# Patient Record
Sex: FEMALE | Race: WHITE | Employment: FULL TIME | URBAN - METROPOLITAN AREA
[De-identification: names, ages, dates, MRNs, and addresses within clinical notes are randomized per-mention and may not be internally consistent; named-entity substitution may affect disease eponyms.]

---

## 2017-09-27 ENCOUNTER — OFFICE VISIT (OUTPATIENT)
Dept: OBGYN | Facility: CLINIC | Age: 37
End: 2017-09-27
Payer: COMMERCIAL

## 2017-09-27 VITALS
BODY MASS INDEX: 25.16 KG/M2 | WEIGHT: 166 LBS | HEIGHT: 68 IN | SYSTOLIC BLOOD PRESSURE: 110 MMHG | DIASTOLIC BLOOD PRESSURE: 68 MMHG

## 2017-09-27 VITALS
HEIGHT: 70 IN | HEART RATE: 68 BPM | WEIGHT: 169 LBS | SYSTOLIC BLOOD PRESSURE: 112 MMHG | BODY MASS INDEX: 24.2 KG/M2 | DIASTOLIC BLOOD PRESSURE: 76 MMHG

## 2017-09-27 DIAGNOSIS — Z01.419 ENCOUNTER FOR GYNECOLOGICAL EXAMINATION WITHOUT ABNORMAL FINDING: Primary | ICD-10-CM

## 2017-09-27 DIAGNOSIS — Z30.09 GENERAL COUNSELING AND ADVICE ON CONTRACEPTIVE MANAGEMENT: ICD-10-CM

## 2017-09-27 DIAGNOSIS — N89.8 WHITE VAGINAL DISCHARGE: ICD-10-CM

## 2017-09-27 DIAGNOSIS — B96.89 BV (BACTERIAL VAGINOSIS): ICD-10-CM

## 2017-09-27 DIAGNOSIS — N76.0 BV (BACTERIAL VAGINOSIS): ICD-10-CM

## 2017-09-27 LAB
SPECIMEN SOURCE: ABNORMAL
WET PREP SPEC: ABNORMAL

## 2017-09-27 PROCEDURE — 99212 OFFICE O/P EST SF 10 MIN: CPT | Mod: 25 | Performed by: OBSTETRICS & GYNECOLOGY

## 2017-09-27 PROCEDURE — 87210 SMEAR WET MOUNT SALINE/INK: CPT | Performed by: OBSTETRICS & GYNECOLOGY

## 2017-09-27 PROCEDURE — 99395 PREV VISIT EST AGE 18-39: CPT | Performed by: OBSTETRICS & GYNECOLOGY

## 2017-09-27 RX ORDER — METRONIDAZOLE 500 MG/1
500 TABLET ORAL 2 TIMES DAILY
Qty: 14 TABLET | Refills: 0 | Status: SHIPPED | OUTPATIENT
Start: 2017-09-27 | End: 2019-11-20

## 2017-09-27 ASSESSMENT — ANXIETY QUESTIONNAIRES
1. FEELING NERVOUS, ANXIOUS, OR ON EDGE: SEVERAL DAYS
5. BEING SO RESTLESS THAT IT IS HARD TO SIT STILL: NOT AT ALL
GAD7 TOTAL SCORE: 4
2. NOT BEING ABLE TO STOP OR CONTROL WORRYING: SEVERAL DAYS
6. BECOMING EASILY ANNOYED OR IRRITABLE: NOT AT ALL
IF YOU CHECKED OFF ANY PROBLEMS ON THIS QUESTIONNAIRE, HOW DIFFICULT HAVE THESE PROBLEMS MADE IT FOR YOU TO DO YOUR WORK, TAKE CARE OF THINGS AT HOME, OR GET ALONG WITH OTHER PEOPLE: NOT DIFFICULT AT ALL
7. FEELING AFRAID AS IF SOMETHING AWFUL MIGHT HAPPEN: SEVERAL DAYS
3. WORRYING TOO MUCH ABOUT DIFFERENT THINGS: SEVERAL DAYS

## 2017-09-27 ASSESSMENT — PATIENT HEALTH QUESTIONNAIRE - PHQ9
5. POOR APPETITE OR OVEREATING: NOT AT ALL
SUM OF ALL RESPONSES TO PHQ QUESTIONS 1-9: 0

## 2017-09-27 NOTE — MR AVS SNAPSHOT
"              After Visit Summary   2017    Mulu Newberry    MRN: [id number]           Patient Information     Date Of Birth          1980        Visit Information        Provider Department      2017 12:30 PM Earline Stafford MD [facility]        Today's Diagnoses     Encounter for gynecological examination without abnormal finding    -  1    White vaginal discharge        General counseling and advice on contraceptive management        BV (bacterial vaginosis)           Follow-ups after your visit        Who to contact     If you have questions or need follow up information about today's clinic visit or your schedule please contact [facility] directly at [phone].  Normal or non-critical lab and imaging results will be communicated to you by MyChart, letter or phone within 4 business days after the clinic has received the results. If you do not hear from us within 7 days, please contact the clinic through [employer] or phone. If you have a critical or abnormal lab result, we will notify you by phone as soon as possible.  Submit refill requests through [employer] or call your pharmacy and they will forward the refill request to us. Please allow 3 business days for your refill to be completed.          Additional Information About Your Visit        MyChart Information     [employer] lets you send messages to your doctor, view your test results, renew your prescriptions, schedule appointments and more. To sign up, go to [email or web address] . Click on \"Log in\" on the left side of the screen, which will take you to the Welcome page. Then click on \"Sign up Now\" on the right side of the page.     You will be asked to enter the access code listed below, as well as some personal information. Please follow the directions to create your username and password.     Your access code is: [email or web address]  Expires: 2017  1:15 PM     Your access code will  in 90 " "days. If you need help or a new code, please call your [facility] clinic or [phone].        Care EveryWhere ID     This is your Care EveryWhere ID. This could be used by other organizations to access your [facility] medical records  [id number]        Your Vitals Were     Height Last Period Breastfeeding? BMI (Body Mass Index)          5' 7.75\" (1.721 m) 09/13/2017 No 25.43 kg/m2         Blood Pressure from Last 3 Encounters:   09/27/17 110/68   11/13/14 112/76   09/01/12 108/74    Weight from Last 3 Encounters:   09/27/17 166 lb (75.3 kg)   11/13/14 169 lb (76.7 kg)   09/01/12 160 lb (72.6 kg)              We Performed the Following     Wet prep          Today's Medication Changes          These changes are accurate as of: 9/27/17  1:15 PM.  If you have any questions, ask your nurse or doctor.               Start taking these medicines.        Dose/Directions    metroNIDAZOLE 500 MG tablet   Commonly known as:  FLAGYL   Used for:  BV (bacterial vaginosis)   Started by:  Earline Stafford MD        Dose:  500 mg   Take 1 tablet (500 mg) by mouth 2 times daily   Quantity:  14 tablet   Refills:  0            Where to get your medicines      These medications were sent to [facility] [address] [place], [place] [address] [facility] [address]  [address] [facility], [facility] [address]     Phone:  [phone]     metroNIDAZOLE 500 MG tablet                Primary Care Provider    None Specified       No primary provider on file.        Equal Access to Services     [facility]: Hadyaneth villarreal Sojosie, waaxda luqadaha, qaybta kaalmaluc pizarro. So [facility] [phone].    ATENCIÓN: Si habla español, tiene a jean disposición servicios gratuitos de asistencia lingüística. Llame al [phone].    We comply with applicable federal civil rights laws and [place] laws. We do not discriminate on the basis of race, color, national origin, " age, disability sex, sexual orientation or gender identity.            Thank you!     Thank you for choosing [facility]  for your care. Our goal is always to provide you with excellent care. Hearing back from our patients is one way we can continue to improve our services. Please take a few minutes to complete the written survey that you may receive in the mail after your visit with us. Thank you!             Your Updated Medication List - Protect others around you: Learn how to safely use, store and throw away your medicines at www.disposemymeds.org.          This list is accurate as of: 9/27/17  1:15 PM.  Always use your most recent med list.                   Brand Name Dispense Instructions for use Diagnosis    metroNIDAZOLE 500 MG tablet    FLAGYL    14 tablet    Take 1 tablet (500 mg) by mouth 2 times daily    BV (bacterial vaginosis)

## 2017-09-27 NOTE — PROGRESS NOTES
Mulu is a 37 year old  female who presents for annual exam.     Besides routine health maintenance,  she would like to discuss birth control, has been using boric acid for recurrent BV.    HPI:  The patient does not have PCP .    Patient is up to dates on paps  Pap and hpv q 5 y  She has chronic vaginal discharge after each menses  Was given prescription for metrogel but never took it  Doesn't want to have kids  Might benefit from mirena iud which could stop her cycles and that would prob mean less issues with vaginitis  Has white discharge today   Given mirena info to take home to read, call with onset of menses to schedule iud insertion   Wet smear today- clue cells + consistent with BV  No odor or itching noted      GYNECOLOGIC HISTORY:    Patient's last menstrual period was 2017.  Her current contraception method is: none.  She  reports that she has quit smoking. Her smoking use included Cigarettes. She has never used smokeless tobacco.      Patient is sexually active.  STD testing offered?  Declined  Last PHQ-9 score on record =   PHQ-9 SCORE 2017   Total Score 0     Last GAD7 score on record =   DARIUS-7 SCORE 2017   Total Score 4     Alcohol Score = 4    HEALTH MAINTENANCE:  Cholesterol: (  Cholesterol   Date Value Ref Range Status   2006 174 0 - 200 mg/dL Final     Comment:     LDL Cholesterol is the primary guide to therapy: LDL-cholesterol goal in high   risk patients is <100 mg/dL and in very high risk patients is <70 mg/dL.   The NCEP recommends further evaluation of: patients with cholesterol <200 mg/dL   if additional risk factors are present, cholesterol >240 mg/dL, triglycerides   >150 mg/dL, or HDL <40 mg/dL.      Last Mammo: NA, Result: not applicable, Next Mammo: 1 year   Pap: (  Lab Results   Component Value Date    PAP NIL 2006    ) 2014 WNL HPV-  Colonoscopy:  NA, Result: not applicable, Next Colonoscopy: due at 48 years.  Dexa:  NA    Health  "maintenance updated:  yes    HISTORY:  Obstetric History       T0      L0     SAB0   TAB0   Ectopic0   Multiple0   Live Births0       Obstetric Comments   LMP unsure, used to use depo for 2 years, stopped on 6 months ago, since then nuvaring for contraception       Patient Active Problem List   Diagnosis     Tobacco use disorder     Contraceptive management     Past Surgical History:   Procedure Laterality Date     TONSILLECTOMY      at age of 15      Social History   Substance Use Topics     Smoking status: Former Smoker     Types: Cigarettes     Smokeless tobacco: Never Used      Comment: casual -- 1 per day, more on weekends     Alcohol use Yes      Comment: casual-5-6 per week      Problem (# of Occurrences) Relation (Name,Age of Onset)    CANCER (4) Maternal Grandmother: ? origin, Maternal Grandfather: lung,smoker, Paternal Grandmother: ?origin, Paternal Grandfather: ?origin    Family History Negative (1) Brother    Hypertension (1) Father    Lipids (1) Father    Neurologic Disorder (1) Sister: seizure, also with learning disability            Current Outpatient Prescriptions   Medication Sig     metroNIDAZOLE (FLAGYL) 500 MG tablet Take 1 tablet (500 mg) by mouth 2 times daily     No current facility-administered medications for this visit.      No Known Allergies    Past medical, surgical, social and family histories were reviewed and updated in EPIC.    ROS:   12 point review of systems negative other than symptoms noted below.  Cardiovascular: Palpitations  Genitourinary: Vaginal Dryness  Endocrine: Decreased Libido  Psychiatric: Anxiety    EXAM:  /68  Ht 5' 7.75\" (1.721 m)  Wt 166 lb (75.3 kg)  LMP 2017  Breastfeeding? No  BMI 25.43 kg/m2   BMI: Body mass index is 25.43 kg/(m^2).    PHYSICAL EXAM:  Constitutional:  Appearance: Well nourished, well developed, alert, in no acute distress  Neck:  Lymph Nodes:  No lymphadenopathy present    Thyroid:  Gland size normal, nontender, " no nodules or masses present  on palpation  Chest:  Respiratory Effort:  Breathing unlabored  Cardiovascular:    Heart: Auscultation:  Regular rate, normal rhythm, no murmurs present  Breasts: Inspection of Breasts:  No lymphadenopathy present., Palpation of Breasts and Axillae:  No masses present on palpation, no breast tenderness., Axillary Lymph Nodes:  No lymphadenopathy present. and No nodularity, asymmetry or nipple discharge bilaterally.  Gastrointestinal:   Abdominal Examination:  Abdomen nontender to palpation, tone normal without rigidity or guarding, no masses present, umbilicus without lesions   Liver and Spleen:  No hepatomegaly present, liver nontender to palpation    Hernias:  No hernias present  Lymphatic: Lymph Nodes:  No other lymphadenopathy present  Skin:  General Inspection:  No rashes present, no lesions present, no areas of  discoloration    Genitalia and Groin:  No rashes present, no lesions present, no areas of  discoloration, no masses present  Neurologic/Psychiatric:    Mental Status:  Oriented X3     Pelvic Exam:  External Genitalia:     Normal appearance for age, no discharge present, no tenderness present, no inflammatory lesions present, color normal  Vagina:     Normal vaginal vault without central or paravaginal defects, no discharge present, no inflammatory lesions present, no masses present  Bladder:     Nontender to palpation  Urethra:   Urethral Body:  Urethra palpation normal, urethra structural support normal   Urethral Meatus:  No erythema or lesions present  Cervix:     Appearance healthy, no lesions present, nontender to palpation, no bleeding present  Uterus:     Uterus: firm, normal sized and nontender, anteverted in position.   Adnexa:     No adnexal tenderness present, no adnexal masses present  Perineum:     Perineum within normal limits, no evidence of trauma, no rashes or skin lesions present  Anus:     Anus within normal limits, no hemorrhoids present  Inguinal Lymph  Nodes:     No lymphadenopathy present  Pubic Hair:     Normal pubic hair distribution for age  Genitalia and Groin:     No rashes present, no lesions present, no areas of discoloration, no masses present      COUNSELING:   Reviewed preventive health counseling, as reflected in patient instructions       disc vaginitis and iud    BMI: Body mass index is 25.43 kg/(m^2).  Weight management plan: wt is ok    ASSESSMENT:  37 year old female with satisfactory annual exam.    ICD-10-CM    1. Encounter for gynecological examination without abnormal finding Z01.419    2. White vaginal discharge N89.8 Wet prep   3. General counseling and advice on contraceptive management Z30.09    4. BV (bacterial vaginosis) N76.0 metroNIDAZOLE (FLAGYL) 500 MG tablet    B96.89        PLAN:  rec return for iud insertion during menses  Wet smear showed  Discussed vaginitis  Discussed contraceptive options  Ph is probably elevated after each menses which could be contributing to her vaginal discharge issues each month  Patient didn't like the metallic taste in her mouth from flagyl so didn't take the metrogel prescription she was given in the past  Additional 10 minute of visit was spent discussing vaginitis and options to suppress menses outside of her preventive services  We are going to use oral flagyl due to how chronic the issue has been  Informed consent obtained for iud insertion today, risks and benefits discussed      Earline Stafford MD

## 2017-09-28 ASSESSMENT — ANXIETY QUESTIONNAIRES: GAD7 TOTAL SCORE: 4

## 2017-10-06 ENCOUNTER — OFFICE VISIT (OUTPATIENT)
Dept: MIDWIFE SERVICES | Facility: CLINIC | Age: 37
End: 2017-10-06
Payer: COMMERCIAL

## 2017-10-06 VITALS
DIASTOLIC BLOOD PRESSURE: 58 MMHG | BODY MASS INDEX: 25.49 KG/M2 | SYSTOLIC BLOOD PRESSURE: 119 MMHG | HEIGHT: 68 IN | WEIGHT: 168.2 LBS

## 2017-10-06 DIAGNOSIS — Z01.812 PRE-PROCEDURE LAB EXAM: ICD-10-CM

## 2017-10-06 DIAGNOSIS — Z30.430 ENCOUNTER FOR INSERTION OF INTRAUTERINE CONTRACEPTIVE DEVICE (IUD): Primary | ICD-10-CM

## 2017-10-06 DIAGNOSIS — Z97.5 PRESENCE OF INTRAUTERINE CONTRACEPTIVE DEVICE: ICD-10-CM

## 2017-10-06 LAB — BETA HCG QUAL IFA URINE: NEGATIVE

## 2017-10-06 PROCEDURE — 58300 INSERT INTRAUTERINE DEVICE: CPT | Performed by: ADVANCED PRACTICE MIDWIFE

## 2017-10-06 PROCEDURE — 84703 CHORIONIC GONADOTROPIN ASSAY: CPT | Performed by: ADVANCED PRACTICE MIDWIFE

## 2017-10-06 NOTE — MR AVS SNAPSHOT
After Visit Summary   10/6/2017    Mulu Newberry    MRN: [id number]           Patient Information     Date Of Birth          1980        Visit Information        Provider Department      10/6/2017 3:30 PM Chula Cortes APRN CNM [facility]        Today's Diagnoses     Encounter for insertion of intrauterine contraceptive device (IUD)    -  1    Pre-procedure lab exam        Presence of intrauterine contraceptive device          Care Instructions    Your Intrauterine Device (IUD)     What to watch for right after IUD placement:      Some women may experience uterine cramps, bleeding, and/or dizziness during and right after IUD placement       To help minimize the cramps, you may take ibuprofen 600 mg with food prior to your appointment. These symptoms should improve over the next 24 hours.  Mild cramping may be present for a few days after IUD placement. You may continue taking ibuprofen as directed if needed      You may experience spotting or bleeding for the first few weeks after IUD placement      Use condoms or abstain from sex for 7 days after the insertion of your Mirena or Jessika IUD      If you experience fever, abdominal pain, worsening pelvic pain, dizziness, unusually heavy vaginal bleeding, suspected expulsion of device or four smelling vaginal discharge please come to the clinic for evaluation      Please schedule an appointment at the clinic for a string check 4-6 weeks after IUD placement    Your periods may change (Jessika/Mirena):      For the first 3 to 6 months, your monthly period may become irregular. You may also have frequent spotting or light bleeding. A few women have heavy bleeding during this time. After your body adjusts, the number of bleeding days is likely to decrease (but may remain irregular), and you may even find that your periods stop altogether for as long as Jessika/Mirena is in place.  Your periods will return rapidly once the IUD is  removed.      ParaGard IUD users:      ParaGard IUD users may experience heavier than normal cycles while their IUD is in place, this is considered normal     Back-up contraception is not needed                Checking for your strings:      We encourage everyone with an IUD in place to check for their strings monthly      You may check your own IUD strings by inserting a finger into the vagina and feeling the strings as they exit the cervix.  The strings will initially feel firm, like fishing line, but will soften over a few weeks.  After the strings have softened, you or your partner should not be able to feel the strings during intercourse.       If the string length greatly changes or if you cannot feel your strings at all please make an appointment to see you midwife and use a backup method of contraception like a condom.      If you can feel something hard/plastic like the IUD may not be in the correct place. You should then see your healthcare provider to have the position confirmed with ultrasound.       Remember:    IUD's do not protect against HIV or STIs.  IUD's do not prevent the formation of ovarian cysts.  IUD's do not typically reduce acne or cause weight gain or mood changes.    For more information:  [email or web address]/      If you have questions or concerns please call:    [facility]   [phone]              Follow-ups after your visit        Follow-up notes from your care team     Return in about 3 months (around 1/6/2018).      Who to contact     If you have questions or need follow up information about today's clinic visit or your schedule please contact [facility] directly at [phone].  Normal or non-critical lab and imaging results will be communicated to you by MyChart, letter or phone within 4 business days after the clinic has received the results. If you do not hear from us within 7 days, please contact the clinic through MyChart or phone.  "If you have a critical or abnormal lab result, we will notify you by phone as soon as possible.  Submit refill requests through [employer] or call your pharmacy and they will forward the refill request to us. Please allow 3 business days for your refill to be completed.          Additional Information About Your Visit        Mobykohart Information     [employer] lets you send messages to your doctor, view your test results, renew your prescriptions, schedule appointments and more. To sign up, go to [email or web address] . Click on \"Log in\" on the left side of the screen, which will take you to the Welcome page. Then click on \"Sign up Now\" on the right side of the page.     You will be asked to enter the access code listed below, as well as some personal information. Please follow the directions to create your username and password.     Your access code is: [email or web address]  Expires: 2017  1:15 PM     Your access code will  in 90 days. If you need help or a new code, please call your [place] clinic or [phone].        Care EveryWhere ID     This is your Care EveryWhere ID. This could be used by other organizations to access your [place] medical records  [id number]        Your Vitals Were     Height Last Period BMI (Body Mass Index)             5' 7.5\" (1.715 m) 2017 25.96 kg/m2          Blood Pressure from Last 3 Encounters:   10/06/17 119/58   17 110/68   14 112/76    Weight from Last 3 Encounters:   10/06/17 168 lb 3.2 oz (76.3 kg)   17 166 lb (75.3 kg)   14 169 lb (76.7 kg)              We Performed the Following     Beta HCG qual IFA urine - [facility] and [place]      LEVONORGESTREL IU 52MG 5 YR     INSERTION INTRAUTERINE DEVICE          Today's Medication Changes          These changes are accurate as of: 10/6/17  4:07 PM.  If you have any questions, ask your nurse or doctor.               Start taking these medicines.        Dose/Directions    levonorgestrel 20 MCG/24HR " IUD   Commonly known as:  MIRENA (52 MG)   Used for:  Encounter for insertion of intrauterine contraceptive device (IUD)   Started by:  Chula Cortes APRN CNM        Dose:  1 each   1 each (20 mcg) by Intrauterine route once for 1 dose   Quantity:  1 each   Refills:  0            Where to get your medicines      Some of these will need a paper prescription and others can be bought over the counter.  Ask your nurse if you have questions.     You don't need a prescription for these medications     levonorgestrel 20 MCG/24HR IUD                Primary Care Provider    None Specified       No primary provider on file.        Equal Access to Services     [facility]: Hadyaneth Carl, allan weslh, june tate, luc gates . So [facility] [phone].    ATENCIÓN: Si habla español, tiene a jean disposición servicios gratuitos de asistencia lingüística. Llame al [phone].    We comply with applicable federal civil rights laws and [place] laws. We do not discriminate on the basis of race, color, national origin, age, disability, sex, sexual orientation, or gender identity.            Thank you!     Thank you for choosing [facility]  for your care. Our goal is always to provide you with excellent care. Hearing back from our patients is one way we can continue to improve our services. Please take a few minutes to complete the written survey that you may receive in the mail after your visit with us. Thank you!             Your Updated Medication List - Protect others around you: Learn how to safely use, store and throw away your medicines at www.disposemymeds.org.          This list is accurate as of: 10/6/17  4:07 PM.  Always use your most recent med list.                   Brand Name Dispense Instructions for use Diagnosis    levonorgestrel 20 MCG/24HR IUD    MIRENA (52 MG)    1 each    1 each (20 mcg) by Intrauterine route once for 1  dose    Encounter for insertion of intrauterine contraceptive device (IUD)       metroNIDAZOLE 500 MG tablet    FLAGYL    14 tablet    Take 1 tablet (500 mg) by mouth 2 times daily    BV (bacterial vaginosis)

## 2017-10-06 NOTE — PROGRESS NOTES
Results discussed directly with patient while patient was present. Any further details documented in the note.    Chula Cortes DNP, APRN, CNM

## 2017-10-06 NOTE — PROGRESS NOTES
MIDWIFE IUD PLACEMENT NOTE    IUD type: Mirena  Lot #: [id number]  NDC#: 88407-137-84    HPI:   Mulu Newberry is a 37 year old female here today for IUD insertion.  Patient's last menstrual period was 09/13/2017.  Today's pregnancy test - Negative  Patient has premedicated with Ibuprofen 600 mgs  Patient did not use Cytotec prior to IUD insertion  STD testing offered? declined  Patient does not have any infections or cervicitis.  Being treated for BV; has chronic BV each month.  Is currently on day 3 of treatment, symptoms have subsided.  Patient does not have history of liver problems or cancer.     Patient has been given written information.  I have reviewed the risks of the IUD including pregnancy, PID, life threatening infection, perforation, expulsion, cramping, changes in bleeding and ovarian cysts. Benefits of the IUD and alternative family planning methods have been discussed.  The probable mechanisms of action were covered, failure rates, spontaneous expulsion, the importance of checking the string monthly, as well as minor or nuisance side effects such as ovarian cysts, migraines, skin changes irregular and unpredictable bleeding in the first 6 months of use and bleeding patterns after the first 6 months.  The 's printed material was provided for her review.  Patients questions are answered.  Patient has verbalized understanding of risks and benefits and has signed the consent form.      Health maintenance updated:  no    No Known Allergies  Current Outpatient Prescriptions   Medication Sig Dispense Refill     metroNIDAZOLE (FLAGYL) 500 MG tablet Take 1 tablet (500 mg) by mouth 2 times daily 14 tablet 0      Past Medical History:   Diagnosis Date     Anxiety      Asthma     exercise induced     Family History   Problem Relation Age of Onset     Hypertension Father      Lipids Father      CANCER Maternal Grandmother      ? origin     CANCER Maternal Grandfather      lung,smoker     CANCER  "Paternal Grandmother      ?origin     CANCER Paternal Grandfather      ?origin     Neurologic Disorder Sister      seizure, also with learning disability     Family History Negative Brother      Social History     Social History     Marital status: Single     Spouse name: N/A     Number of children: N/A     Years of education: N/A     Occupational History     dental hyegenist [employer]     Social History Main Topics     Smoking status: Former Smoker     Types: Cigarettes     Smokeless tobacco: Never Used      Comment: casual -- 1 per day, more on weekends     Alcohol use Yes      Comment: casual-5-6 per week     Drug use: No     Sexual activity: Yes     Partners: Male     Birth control/ protection: None     Other Topics Concern      Service No     Blood Transfusions No     Caffeine Concern Yes     1-2 cups per day     Occupational Exposure Yes     dental hyegeinist     Hobby Hazards No     Sleep Concern No     Stress Concern No     Weight Concern No     Special Diet Yes     tries to eat organic, avoids sugar     Back Care No     Exercise Yes     works once a day     Bike Helmet No     Seat Belt Yes     Self-Exams Yes     Social History Narrative    Single, works fulltime, no children     Past Surgical History:   Procedure Laterality Date     TONSILLECTOMY      at age of 15       REVIEW OF SYSTEMS:  12 point review of systems negative other than symptoms noted below.      EXAM:  /58  Ht 5' 7.5\" (1.715 m)  Wt 168 lb 3.2 oz (76.3 kg)  LMP 09/13/2017  BMI 25.96 kg/m2    PELVIC EXAM:  Vulva: No external lesions, BUS WNL  Vagina: Moist, pink, discharge normal  well rugated, no lesions  Cervix: smooth, pink, no visible lesions, neg CMT  Uterus: Normal size, retroverted, non-tender, mobile  Ovaries: No mass, non-tender  Rectal exam: deferred      ASSESSMENT/ PLAN:  (Z01.812) Pre-procedure lab exam  (primary encounter diagnosis)  Plan: Beta HCG qual IFA urine - [facility] and [place]    Results for " orders placed or performed in visit on 10/06/17   Beta HCG qual IFA urine - [facility] [place] and [place]   Result Value Ref Range    Beta HCG Qual IFA Urine Negative NEG^Negative          Procedure:  Uterus assessed for position and is retroverted.  Sterile speculum inserted.  Betadine prep of cervix done.  Tenaculum applied at 10 and 2 o'clock.  Uterine sounded to 7cm's.  Cervical dilator (Os-finder) used.   IUD inserted in the usual fashion without difficulty.  Tenaculum removed with scant bleeding from the cervix.  Strings trimmed to 2 cm's.  Patient tolerated the procedure well        COUNSELING:  Verbal and written instructions given to patient regarding checking IUD strings.    Reviewed warning signs of fever, sharp/severe abdominal pain, reassured it can be normal to have menstrual like cramping after placement and spotting/light bleeding may last a few weeks after placement.    Reviewed with patient that the IUD needs to be replaced in 5 years, nothing in vagina for 2 days following placement of Mirena or Jessika IUD's.  Use b/u method for one week following IUD placement.    Follow up appointment in 6 to 12 weeks for IUD/string check    Chula Cortes, JUDAH, APRN, CNM

## 2017-10-06 NOTE — PATIENT INSTRUCTIONS
Your Intrauterine Device (IUD)     What to watch for right after IUD placement:      Some women may experience uterine cramps, bleeding, and/or dizziness during and right after IUD placement       To help minimize the cramps, you may take ibuprofen 600 mg with food prior to your appointment. These symptoms should improve over the next 24 hours.  Mild cramping may be present for a few days after IUD placement. You may continue taking ibuprofen as directed if needed      You may experience spotting or bleeding for the first few weeks after IUD placement      Use condoms or abstain from sex for 7 days after the insertion of your Mirena or Jessika IUD      If you experience fever, abdominal pain, worsening pelvic pain, dizziness, unusually heavy vaginal bleeding, suspected expulsion of device or four smelling vaginal discharge please come to the clinic for evaluation      Please schedule an appointment at the clinic for a string check 4-6 weeks after IUD placement    Your periods may change (Jessika/Mirena):      For the first 3 to 6 months, your monthly period may become irregular. You may also have frequent spotting or light bleeding. A few women have heavy bleeding during this time. After your body adjusts, the number of bleeding days is likely to decrease (but may remain irregular), and you may even find that your periods stop altogether for as long as Jessika/Mirena is in place.  Your periods will return rapidly once the IUD is removed.      ParaGard IUD users:      ParaGard IUD users may experience heavier than normal cycles while their IUD is in place, this is considered normal     Back-up contraception is not needed                Checking for your strings:      We encourage everyone with an IUD in place to check for their strings monthly      You may check your own IUD strings by inserting a finger into the vagina and feeling the strings as they exit the cervix.  The strings will initially feel firm, like fishing  line, but will soften over a few weeks.  After the strings have softened, you or your partner should not be able to feel the strings during intercourse.       If the string length greatly changes or if you cannot feel your strings at all please make an appointment to see you midwife and use a backup method of contraception like a condom.      If you can feel something hard/plastic like the IUD may not be in the correct place. You should then see your healthcare provider to have the position confirmed with ultrasound.       Remember:    IUD's do not protect against HIV or STIs.  IUD's do not prevent the formation of ovarian cysts.  IUD's do not typically reduce acne or cause weight gain or mood changes.    For more information:  [email or web address]      If you have questions or concerns please call:    [facility]   [phone]

## 2019-11-19 NOTE — PROGRESS NOTES
Mulu is a 39 year old  female who presents for annual exam.     Besides routine health maintenance, she has no other health concerns today .    HPI:    Mirena iud placed last year has stopped her periods and cured her chronic recurrent vaginitis  Got  now  She is much happier and feels great  Pap and hpv due this yr    The patient's PCP is Dr Sneha Bynum      GYNECOLOGIC HISTORY:    No LMP recorded. (Menstrual status: IUD).    Regular menses? no  Menses every 0 days.      Her current contraception method is: IUD.  She  reports that she has quit smoking. Her smoking use included cigarettes. She has never used smokeless tobacco.    Patient is sexually active.  STD testing offered?  Declined  Last PHQ-9 score on record =   PHQ-9 SCORE 2019   PHQ-9 Total Score 0     Last GAD7 score on record =   DARIUS-7 SCORE 2019   Total Score 0     Alcohol Score = 0    HEALTH MAINTENANCE:  Cholesterol:   Cholesterol   Date Value Ref Range Status   2006 174 0 - 200 mg/dL Final     Comment:     LDL Cholesterol is the primary guide to therapy: LDL-cholesterol goal in high   risk patients is <100 mg/dL and in very high risk patients is <70 mg/dL.   The NCEP recommends further evaluation of: patients with cholesterol <200 mg/dL   if additional risk factors are present, cholesterol >240 mg/dL, triglycerides   >150 mg/dL, or HDL <40 mg/dL.     Last Mammo: scheduled for 2019   Pap: 2011 NIL HPV-  Lab Results   Component Value Date    PAP NIL 2006    Colonoscopy:  NA, Result: Not applicable, Next Colonoscopy: Age 48  Dexa:  NA  Health maintenance updated:  yes    HISTORY:  OB History    Para Term  AB Living   0 0 0 0 0 0   SAB TAB Ectopic Multiple Live Births   0 0 0 0 0   Obstetric Comments   LMP unsure, used to use depo for 2 years, stopped on 6 months ago, since then nuvaring for contraception       Patient Active Problem List   Diagnosis     Tobacco use disorder      "Presence of intrauterine contraceptive device     Past Surgical History:   Procedure Laterality Date     TONSILLECTOMY      at age of 15      Social History     Tobacco Use     Smoking status: Former Smoker     Types: Cigarettes     Smokeless tobacco: Never Used     Tobacco comment: casual -- 1 per day, more on weekends   Substance Use Topics     Alcohol use: Yes     Comment: casual-5-6 per week      Problem (# of Occurrences) Relation (Name,Age of Onset)    Cancer (4) Maternal Grandmother: ? origin, Maternal Grandfather: lung,smoker, Paternal Grandmother: ?origin, Paternal Grandfather: ?origin    Family History Negative (1) Brother    Hypertension (1) Father    Lipids (1) Father    Neurologic Disorder (1) Sister: seizure, also with learning disability            Current Outpatient Medications   Medication Sig     levonorgestrel (MIRENA, 52 MG,) 20 MCG/24HR IUD 1 each (20 mcg) by Intrauterine route once for 1 dose     sertraline (ZOLOFT) 50 MG tablet TK 1  T PO  QD     No current facility-administered medications for this visit.      No Known Allergies    Past medical, surgical, social and family histories were reviewed and updated in EPIC.    ROS:   12 point review of systems negative other than symptoms noted below or in the HPI.  No urinary frequency or dysuria, bladder or kidney problems    EXAM:  /64   Pulse 78   Ht 1.715 m (5' 7.5\")   Wt 82.6 kg (182 lb)   BMI 28.08 kg/m     BMI: Body mass index is 28.08 kg/m .    PHYSICAL EXAM:  Constitutional:   Appearance: Well nourished, well developed, alert, in no acute distress  Neck:  Lymph Nodes:  No lymphadenopathy present    Thyroid:  Gland size normal, nontender, no nodules or masses present  on palpation  Chest:  Respiratory Effort:  Breathing unlabored  Cardiovascular:    Heart: Auscultation:  Regular rate, normal rhythm, no murmurs present  Breasts: Inspection of Breasts:  No lymphadenopathy present., Palpation of Breasts and Axillae:  No masses " present on palpation, no breast tenderness., Axillary Lymph Nodes:  No lymphadenopathy present. and No nodularity, asymmetry or nipple discharge bilaterally.  Gastrointestinal:   Abdominal Examination:  Abdomen nontender to palpation, tone normal without rigidity or guarding, no masses present, umbilicus without lesions   Liver and Spleen:  No hepatomegaly present, liver nontender to palpation    Hernias:  No hernias present  Lymphatic: Lymph Nodes:  No other lymphadenopathy present  Skin:  General Inspection:  No rashes present, no lesions present, no areas of  discoloration  Neurologic:    Mental Status:  Oriented X3.  Normal strength and tone, sensory exam                grossly normal, mentation intact and speech normal.    Psychiatric:   Mentation appears normal and affect normal/bright.         Pelvic Exam:  External Genitalia:     Normal appearance for age, no discharge present, no tenderness present, no inflammatory lesions present, color normal  Vagina:     Normal vaginal vault without central or paravaginal defects, no discharge present, no inflammatory lesions present, no masses present  Bladder:     Nontender to palpation  Urethra:   Urethral Body:  Urethra palpation normal, urethra structural support normal   Urethral Meatus:  No erythema or lesions present  Cervix:     Appearance healthy, no lesions present, nontender to palpation, no bleeding present  Uterus:     Uterus: firm, normal sized and nontender, midplane in position.   Adnexa:     No adnexal tenderness present, no adnexal masses present  Perineum:     Perineum within normal limits, no evidence of trauma, no rashes or skin lesions present  Anus:     Anus within normal limits, no hemorrhoids present  Inguinal Lymph Nodes:     No lymphadenopathy present  Pubic Hair:     Normal pubic hair distribution for age  Genitalia and Groin:     No rashes present, no lesions present, no areas of discoloration, no masses present      COUNSELING:   Reviewed  preventive health counseling, as reflected in patient instructions       Regular exercise       Healthy diet/nutrition    BMI: Body mass index is 28.08 kg/m .  Weight management plan: Discussed healthy diet and exercise guidelines    ASSESSMENT:  39 year old female with satisfactory annual exam.    ICD-10-CM    1. Screening for cervical cancer Z12.4 Pap imaged thin layer screen with HPV - recommended age 30 - 65   2. Screening for HPV (human papillomavirus) Z11.51 HPV High Risk Types DNA Cervical       PLAN:  iud string seen  Pap and hpv done  Discussed pap guidelines  Return 1 yr    Earline Stafford MD

## 2019-11-20 ENCOUNTER — OFFICE VISIT (OUTPATIENT)
Dept: OBGYN | Facility: CLINIC | Age: 39
End: 2019-11-20
Payer: COMMERCIAL

## 2019-11-20 ENCOUNTER — ANCILLARY PROCEDURE (OUTPATIENT)
Dept: MAMMOGRAPHY | Facility: CLINIC | Age: 39
End: 2019-11-20
Payer: COMMERCIAL

## 2019-11-20 VITALS
BODY MASS INDEX: 27.58 KG/M2 | DIASTOLIC BLOOD PRESSURE: 64 MMHG | HEART RATE: 78 BPM | WEIGHT: 182 LBS | HEIGHT: 68 IN | SYSTOLIC BLOOD PRESSURE: 102 MMHG

## 2019-11-20 DIAGNOSIS — Z12.31 VISIT FOR SCREENING MAMMOGRAM: ICD-10-CM

## 2019-11-20 DIAGNOSIS — Z12.4 SCREENING FOR CERVICAL CANCER: Primary | ICD-10-CM

## 2019-11-20 DIAGNOSIS — Z11.51 SCREENING FOR HPV (HUMAN PAPILLOMAVIRUS): ICD-10-CM

## 2019-11-20 PROCEDURE — 77063 BREAST TOMOSYNTHESIS BI: CPT | Mod: TC

## 2019-11-20 PROCEDURE — 77067 SCR MAMMO BI INCL CAD: CPT | Mod: TC

## 2019-11-20 PROCEDURE — G0145 SCR C/V CYTO,THINLAYER,RESCR: HCPCS | Performed by: OBSTETRICS & GYNECOLOGY

## 2019-11-20 PROCEDURE — 99396 PREV VISIT EST AGE 40-64: CPT | Performed by: OBSTETRICS & GYNECOLOGY

## 2019-11-20 PROCEDURE — 87624 HPV HI-RISK TYP POOLED RSLT: CPT | Performed by: OBSTETRICS & GYNECOLOGY

## 2019-11-20 ASSESSMENT — ANXIETY QUESTIONNAIRES
GAD7 TOTAL SCORE: 0
5. BEING SO RESTLESS THAT IT IS HARD TO SIT STILL: NOT AT ALL
6. BECOMING EASILY ANNOYED OR IRRITABLE: NOT AT ALL
IF YOU CHECKED OFF ANY PROBLEMS ON THIS QUESTIONNAIRE, HOW DIFFICULT HAVE THESE PROBLEMS MADE IT FOR YOU TO DO YOUR WORK, TAKE CARE OF THINGS AT HOME, OR GET ALONG WITH OTHER PEOPLE: NOT DIFFICULT AT ALL
1. FEELING NERVOUS, ANXIOUS, OR ON EDGE: NOT AT ALL
7. FEELING AFRAID AS IF SOMETHING AWFUL MIGHT HAPPEN: NOT AT ALL
3. WORRYING TOO MUCH ABOUT DIFFERENT THINGS: NOT AT ALL
2. NOT BEING ABLE TO STOP OR CONTROL WORRYING: NOT AT ALL

## 2019-11-20 ASSESSMENT — PATIENT HEALTH QUESTIONNAIRE - PHQ9
5. POOR APPETITE OR OVEREATING: NOT AT ALL
SUM OF ALL RESPONSES TO PHQ QUESTIONS 1-9: 0

## 2019-11-20 ASSESSMENT — MIFFLIN-ST. JEOR: SCORE: 1531.11

## 2019-11-20 NOTE — LETTER
November 27, 2019    Mulu Maya  [address]  [place] [address]    Dear ,  This letter is regarding your recent Pap smear (cervical cancer screening) and Human Papillomavirus (HPV) test.  We are happy to inform you that your Pap smear result is normal. Cervical cancer is closely linked with certain types of HPV. Your results showed no evidence of high-risk HPV.  We recommend you have your next PAP smear and HPV test in 5 years.  You will still need to return to the clinic every year for an annual exam and other preventive tests.  If you have additional questions regarding this result, please call our registered nurse, Chula at [phone].  Sincerely,    Earline Stafford MD/alli

## 2019-11-20 NOTE — LETTER
November 27, 2019    Mulu Maya  [address]  [place] [address]    Dear ,  This letter is regarding your recent Pap smear (cervical cancer screening) and Human Papillomavirus (HPV) test.  We are happy to inform you that your Pap smear result is normal. Cervical cancer is closely linked with certain types of HPV. Your results showed no evidence of high-risk HPV.  We recommend you have your next PAP smear and HPV test in 5 years.  You will still need to return to the clinic every year for an annual exam and other preventive tests.  If you have additional questions regarding this result, please call our registered nurse, Jenny at [phone].  Sincerely,    Earline Stafford MD/alli

## 2019-11-21 ASSESSMENT — ANXIETY QUESTIONNAIRES: GAD7 TOTAL SCORE: 0

## 2019-11-26 LAB
COPATH REPORT: NORMAL
PAP: NORMAL

## 2019-11-27 LAB
FINAL DIAGNOSIS: NORMAL
HPV HR 12 DNA CVX QL NAA+PROBE: NEGATIVE
HPV16 DNA SPEC QL NAA+PROBE: NEGATIVE
HPV18 DNA SPEC QL NAA+PROBE: NEGATIVE
SPECIMEN DESCRIPTION: NORMAL
SPECIMEN SOURCE CVX/VAG CYTO: NORMAL

## 2021-01-28 ENCOUNTER — AMBULATORY - HEALTHEAST (OUTPATIENT)
Dept: NURSING | Facility: CLINIC | Age: 41
End: 2021-01-28

## 2021-02-03 NOTE — PROGRESS NOTES
Mulu is a 40 year old  female who presents for annual exam.     Besides routine health maintenance, she has no other health concerns today.    HPI:  The patient's PCP is Dr. Sneha Bynum MD.    Mirena iud  has stopped her periods and cured her chronic recurrent vaginitis    now  She had first dose of covid vaccine last wk  Pap and hpv normal   Fasting labs today      GYNECOLOGIC HISTORY:    No LMP recorded. (Menstrual status: IUD).    Her current contraception method is: IUD.  She  reports that she has quit smoking. Her smoking use included cigarettes. She has never used smokeless tobacco.    Patient is sexually active.    Last PHQ-9 score on record =   PHQ-9 SCORE 2021   PHQ-9 Total Score 0     Last GAD7 score on record =   DARIUS-7 SCORE 2021   Total Score 0     Alcohol Score = 3    HEALTH MAINTENANCE:  Cholesterol: No results   Last Mammo: 19, Result: Normal, Next Mammo: 21   Pap:   Lab Results   Component Value Date    PAP NIL NEG-HPV 2019    PAP NIL 2006     Colonoscopy: N/A, Next Colonoscopy: Due at age 43   Dexa: N/A    Health maintenance updated:  yes    HISTORY:  OB History    Para Term  AB Living   0 0 0 0 0 0   SAB TAB Ectopic Multiple Live Births   0 0 0 0 0   Obstetric Comments   LMP unsure, used to use depo for 2 years, stopped on 6 months ago, since then nuvaring for contraception       Patient Active Problem List   Diagnosis     Tobacco use disorder     Presence of intrauterine contraceptive device     Past Surgical History:   Procedure Laterality Date     TONSILLECTOMY      at age of 15      Social History     Tobacco Use     Smoking status: Former Smoker     Types: Cigarettes     Smokeless tobacco: Never Used     Tobacco comment: casual -- 1 per day, more on weekends   Substance Use Topics     Alcohol use: Yes     Frequency: 2-3 times a week     Drinks per session: 1 or 2     Binge frequency: Never     Comment: casual-5-6 per week  "     Problem (# of Occurrences) Relation (Name,Age of Onset)    Cancer (4) Maternal Grandmother: ? origin, Maternal Grandfather: lung,smoker, Paternal Grandmother: ?origin, Paternal Grandfather: ?origin    Family History Negative (1) Brother    Hypertension (1) Father    Lipids (1) Father    Neurologic Disorder (1) Sister: seizure, also with learning disability            Current Outpatient Medications   Medication Sig     sertraline (ZOLOFT) 50 MG tablet 100 mg      levonorgestrel (MIRENA, 52 MG,) 20 MCG/24HR IUD 1 each (20 mcg) by Intrauterine route once for 1 dose     No current facility-administered medications for this visit.      No Known Allergies    Past medical, surgical, social and family histories were reviewed and updated in EPIC.    ROS:   12 point review of systems negative other than symptoms noted below or in the HPI.  No urinary frequency or dysuria, bladder or kidney problems    EXAM:  /68   Ht 1.715 m (5' 7.5\")   Wt 79.4 kg (175 lb)   BMI 27.00 kg/m     BMI: Body mass index is 27 kg/m .    PHYSICAL EXAM:  Constitutional:   Appearance: Well nourished, well developed, alert, in no acute distress  Neck:  Lymph Nodes:  No lymphadenopathy present    Thyroid:  Gland size normal, nontender, no nodules or masses present  on palpation  Chest:  Respiratory Effort:  Breathing unlabored  Cardiovascular:    Heart: Auscultation:  Regular rate, normal rhythm, no murmurs present  Breasts: Inspection of Breasts:  No lymphadenopathy present., Palpation of Breasts and Axillae:  No masses present on palpation, no breast tenderness., Axillary Lymph Nodes:  No lymphadenopathy present. and No nodularity, asymmetry or nipple discharge bilaterally.  Gastrointestinal:   Abdominal Examination:  Abdomen nontender to palpation, tone normal without rigidity or guarding, no masses present, umbilicus without lesions   Liver and Spleen:  No hepatomegaly present, liver nontender to palpation    Hernias:  No hernias " present  Lymphatic: Lymph Nodes:  No other lymphadenopathy present  Skin:  General Inspection:  No rashes present, no lesions present, no areas of  discoloration  Neurologic:    Mental Status:  Oriented X3.  Normal strength and tone, sensory exam                grossly normal, mentation intact and speech normal.    Psychiatric:   Mentation appears normal and affect normal/bright.         Pelvic Exam:  External Genitalia:     Normal appearance for age, no discharge present, no tenderness present, no inflammatory lesions present, color normal  Vagina:     Normal vaginal vault without central or paravaginal defects, no discharge present, no inflammatory lesions present, no masses present  Bladder:     Nontender to palpation  Urethra:   Urethral Body:  Urethra palpation normal, urethra structural support normal   Urethral Meatus:  No erythema or lesions present  Cervix:     Appearance healthy, no lesions present, nontender to palpation, no bleeding present  Uterus:     Uterus: firm, normal sized and nontender, anteverted in position.   Adnexa:     No adnexal tenderness present, no adnexal masses present  Perineum:     Perineum within normal limits, no evidence of trauma, no rashes or skin lesions present  Anus:     Anus within normal limits, no hemorrhoids present  Inguinal Lymph Nodes:     No lymphadenopathy present  Pubic Hair:     Normal pubic hair distribution for age  Genitalia and Groin:     No rashes present, no lesions present, no areas of discoloration, no masses present      COUNSELING:   Reviewed preventive health counseling, as reflected in patient instructions    BMI: Body mass index is 27 kg/m .      ASSESSMENT:  40 year old female with satisfactory annual exam.    ICD-10-CM    1. Encounter for gynecological examination without abnormal finding  Z01.419    2. Encounter for lipid screening for cardiovascular disease  Z13.220 Lipid panel reflex to direct LDL Fasting    Z13.6    3. Screening for metabolic  disorder  Z13.228 Comprehensive metabolic panel       PLAN:  Labs today  Discussed covid issues      Earline Stafford MD

## 2021-02-04 ENCOUNTER — OFFICE VISIT (OUTPATIENT)
Dept: OBGYN | Facility: CLINIC | Age: 41
End: 2021-02-04
Payer: COMMERCIAL

## 2021-02-04 VITALS
HEIGHT: 68 IN | SYSTOLIC BLOOD PRESSURE: 108 MMHG | WEIGHT: 175 LBS | DIASTOLIC BLOOD PRESSURE: 68 MMHG | BODY MASS INDEX: 26.52 KG/M2

## 2021-02-04 DIAGNOSIS — Z13.228 SCREENING FOR METABOLIC DISORDER: ICD-10-CM

## 2021-02-04 DIAGNOSIS — Z13.6 ENCOUNTER FOR LIPID SCREENING FOR CARDIOVASCULAR DISEASE: ICD-10-CM

## 2021-02-04 DIAGNOSIS — Z01.419 ENCOUNTER FOR GYNECOLOGICAL EXAMINATION WITHOUT ABNORMAL FINDING: Primary | ICD-10-CM

## 2021-02-04 DIAGNOSIS — Z13.220 ENCOUNTER FOR LIPID SCREENING FOR CARDIOVASCULAR DISEASE: ICD-10-CM

## 2021-02-04 PROCEDURE — 80053 COMPREHEN METABOLIC PANEL: CPT | Performed by: OBSTETRICS & GYNECOLOGY

## 2021-02-04 PROCEDURE — 36415 COLL VENOUS BLD VENIPUNCTURE: CPT | Performed by: OBSTETRICS & GYNECOLOGY

## 2021-02-04 PROCEDURE — 99396 PREV VISIT EST AGE 40-64: CPT | Performed by: OBSTETRICS & GYNECOLOGY

## 2021-02-04 PROCEDURE — 80061 LIPID PANEL: CPT | Performed by: OBSTETRICS & GYNECOLOGY

## 2021-02-04 SDOH — HEALTH STABILITY: MENTAL HEALTH: HOW OFTEN DO YOU HAVE A DRINK CONTAINING ALCOHOL?: 2-3 TIMES A WEEK

## 2021-02-04 SDOH — HEALTH STABILITY: MENTAL HEALTH: HOW OFTEN DO YOU HAVE 6 OR MORE DRINKS ON ONE OCCASION?: NEVER

## 2021-02-04 SDOH — HEALTH STABILITY: MENTAL HEALTH: HOW MANY STANDARD DRINKS CONTAINING ALCOHOL DO YOU HAVE ON A TYPICAL DAY?: 1 OR 2

## 2021-02-04 ASSESSMENT — ANXIETY QUESTIONNAIRES
5. BEING SO RESTLESS THAT IT IS HARD TO SIT STILL: NOT AT ALL
GAD7 TOTAL SCORE: 0
7. FEELING AFRAID AS IF SOMETHING AWFUL MIGHT HAPPEN: NOT AT ALL
3. WORRYING TOO MUCH ABOUT DIFFERENT THINGS: NOT AT ALL
2. NOT BEING ABLE TO STOP OR CONTROL WORRYING: NOT AT ALL
6. BECOMING EASILY ANNOYED OR IRRITABLE: NOT AT ALL
1. FEELING NERVOUS, ANXIOUS, OR ON EDGE: NOT AT ALL

## 2021-02-04 ASSESSMENT — MIFFLIN-ST. JEOR: SCORE: 1494.35

## 2021-02-04 ASSESSMENT — PATIENT HEALTH QUESTIONNAIRE - PHQ9
SUM OF ALL RESPONSES TO PHQ QUESTIONS 1-9: 0
5. POOR APPETITE OR OVEREATING: NOT AT ALL

## 2021-02-05 LAB
ALBUMIN SERPL-MCNC: 4 G/DL (ref 3.4–5)
ALP SERPL-CCNC: 53 U/L (ref 40–150)
ALT SERPL W P-5'-P-CCNC: 47 U/L (ref 0–50)
ANION GAP SERPL CALCULATED.3IONS-SCNC: 5 MMOL/L (ref 3–14)
AST SERPL W P-5'-P-CCNC: 32 U/L (ref 0–45)
BILIRUB SERPL-MCNC: 0.6 MG/DL (ref 0.2–1.3)
BUN SERPL-MCNC: 9 MG/DL (ref 7–30)
CALCIUM SERPL-MCNC: 9 MG/DL (ref 8.5–10.1)
CHLORIDE SERPL-SCNC: 101 MMOL/L (ref 94–109)
CHOLEST SERPL-MCNC: 208 MG/DL
CO2 SERPL-SCNC: 28 MMOL/L (ref 20–32)
CREAT SERPL-MCNC: 0.58 MG/DL (ref 0.52–1.04)
GFR SERPL CREATININE-BSD FRML MDRD: >90 ML/MIN/{1.73_M2}
GLUCOSE SERPL-MCNC: 88 MG/DL (ref 70–99)
HDLC SERPL-MCNC: 116 MG/DL
LDLC SERPL CALC-MCNC: 86 MG/DL
NONHDLC SERPL-MCNC: 92 MG/DL
POTASSIUM SERPL-SCNC: 4 MMOL/L (ref 3.4–5.3)
PROT SERPL-MCNC: 6.9 G/DL (ref 6.8–8.8)
SODIUM SERPL-SCNC: 134 MMOL/L (ref 133–144)
TRIGL SERPL-MCNC: 29 MG/DL

## 2021-02-05 ASSESSMENT — ANXIETY QUESTIONNAIRES: GAD7 TOTAL SCORE: 0

## 2021-02-11 ENCOUNTER — HOSPITAL ENCOUNTER (OUTPATIENT)
Dept: MAMMOGRAPHY | Facility: CLINIC | Age: 41
Discharge: HOME | End: 2021-02-11
Attending: OBSTETRICS & GYNECOLOGY | Admitting: OBSTETRICS & GYNECOLOGY
Payer: COMMERCIAL

## 2021-02-11 DIAGNOSIS — Z12.31 VISIT FOR SCREENING MAMMOGRAM: ICD-10-CM

## 2021-02-11 PROCEDURE — 77063 BREAST TOMOSYNTHESIS BI: CPT

## 2021-02-18 ENCOUNTER — AMBULATORY - HEALTHEAST (OUTPATIENT)
Dept: NURSING | Facility: CLINIC | Age: 41
End: 2021-02-18

## 2021-02-23 ENCOUNTER — HOSPITAL ENCOUNTER (OUTPATIENT)
Dept: MAMMOGRAPHY | Facility: CLINIC | Age: 41
Discharge: HOME | End: 2021-02-23
Attending: OBSTETRICS & GYNECOLOGY | Admitting: OBSTETRICS & GYNECOLOGY
Payer: COMMERCIAL

## 2021-02-23 DIAGNOSIS — R92.8 ABNORMAL MAMMOGRAM: ICD-10-CM

## 2021-02-23 PROCEDURE — 76642 ULTRASOUND BREAST LIMITED: CPT | Mod: LT

## 2021-09-25 ENCOUNTER — HEALTH MAINTENANCE LETTER (OUTPATIENT)
Age: 41
End: 2021-09-25

## 2022-03-12 ENCOUNTER — HEALTH MAINTENANCE LETTER (OUTPATIENT)
Age: 42
End: 2022-03-12

## 2022-12-26 ENCOUNTER — HEALTH MAINTENANCE LETTER (OUTPATIENT)
Age: 42
End: 2022-12-26

## 2023-04-22 ENCOUNTER — HEALTH MAINTENANCE LETTER (OUTPATIENT)
Age: 43
End: 2023-04-22

## 2023-09-23 ENCOUNTER — HEALTH MAINTENANCE LETTER (OUTPATIENT)
Age: 43
End: 2023-09-23